# Patient Record
Sex: FEMALE | Race: BLACK OR AFRICAN AMERICAN | NOT HISPANIC OR LATINO | URBAN - METROPOLITAN AREA
[De-identification: names, ages, dates, MRNs, and addresses within clinical notes are randomized per-mention and may not be internally consistent; named-entity substitution may affect disease eponyms.]

---

## 2024-11-10 ENCOUNTER — APPOINTMENT (OUTPATIENT)
Dept: RADIOLOGY | Facility: HOSPITAL | Age: 39
End: 2024-11-10

## 2024-11-10 ENCOUNTER — HOSPITAL ENCOUNTER (OUTPATIENT)
Facility: HOSPITAL | Age: 39
Setting detail: OBSERVATION
Discharge: HOME | End: 2024-11-10
Attending: STUDENT IN AN ORGANIZED HEALTH CARE EDUCATION/TRAINING PROGRAM | Admitting: STUDENT IN AN ORGANIZED HEALTH CARE EDUCATION/TRAINING PROGRAM

## 2024-11-10 VITALS
HEIGHT: 68 IN | HEART RATE: 61 BPM | BODY MASS INDEX: 33.34 KG/M2 | TEMPERATURE: 96.4 F | OXYGEN SATURATION: 98 % | DIASTOLIC BLOOD PRESSURE: 59 MMHG | SYSTOLIC BLOOD PRESSURE: 123 MMHG | WEIGHT: 220 LBS | RESPIRATION RATE: 16 BRPM

## 2024-11-10 DIAGNOSIS — R11.2 NAUSEA AND VOMITING, UNSPECIFIED VOMITING TYPE: ICD-10-CM

## 2024-11-10 DIAGNOSIS — R10.84 GENERALIZED ABDOMINAL PAIN: Primary | ICD-10-CM

## 2024-11-10 PROBLEM — H66.90 ACUTE OTITIS MEDIA: Status: ACTIVE | Noted: 2024-11-10

## 2024-11-10 LAB
ALBUMIN SERPL BCP-MCNC: 4 G/DL (ref 3.4–5)
ALP SERPL-CCNC: 66 U/L (ref 33–110)
ALT SERPL W P-5'-P-CCNC: 8 U/L (ref 7–45)
ANION GAP SERPL CALC-SCNC: 14 MMOL/L (ref 10–20)
APTT PPP: 29 SECONDS (ref 27–38)
AST SERPL W P-5'-P-CCNC: 19 U/L (ref 9–39)
B-HCG SERPL-ACNC: <2 MIU/ML
BASOPHILS # BLD AUTO: 0.04 X10*3/UL (ref 0–0.1)
BASOPHILS NFR BLD AUTO: 0.7 %
BILIRUB SERPL-MCNC: 0.3 MG/DL (ref 0–1.2)
BUN SERPL-MCNC: 9 MG/DL (ref 6–23)
CALCIUM SERPL-MCNC: 9.4 MG/DL (ref 8.6–10.3)
CHLORIDE SERPL-SCNC: 102 MMOL/L (ref 98–107)
CO2 SERPL-SCNC: 22 MMOL/L (ref 21–32)
CREAT SERPL-MCNC: 0.75 MG/DL (ref 0.5–1.05)
EGFRCR SERPLBLD CKD-EPI 2021: >90 ML/MIN/1.73M*2
EOSINOPHIL # BLD AUTO: 0.08 X10*3/UL (ref 0–0.7)
EOSINOPHIL NFR BLD AUTO: 1.3 %
ERYTHROCYTE [DISTWIDTH] IN BLOOD BY AUTOMATED COUNT: 16.3 % (ref 11.5–14.5)
FERRITIN SERPL-MCNC: 9 NG/ML (ref 8–150)
GLUCOSE SERPL-MCNC: 200 MG/DL (ref 74–99)
HCT VFR BLD AUTO: 33.9 % (ref 36–46)
HGB BLD-MCNC: 10.2 G/DL (ref 12–16)
IMM GRANULOCYTES # BLD AUTO: 0.1 X10*3/UL (ref 0–0.7)
IMM GRANULOCYTES NFR BLD AUTO: 1.6 % (ref 0–0.9)
INR PPP: 1 (ref 0.9–1.1)
IRON SATN MFR SERPL: 3 % (ref 25–45)
IRON SERPL-MCNC: 14 UG/DL (ref 35–150)
LACTATE SERPL-SCNC: 1.6 MMOL/L (ref 0.4–2)
LACTATE SERPL-SCNC: 2.8 MMOL/L (ref 0.4–2)
LIPASE SERPL-CCNC: 49 U/L (ref 9–82)
LYMPHOCYTES # BLD AUTO: 1.71 X10*3/UL (ref 1.2–4.8)
LYMPHOCYTES NFR BLD AUTO: 27.8 %
MAGNESIUM SERPL-MCNC: 1.51 MG/DL (ref 1.6–2.4)
MCH RBC QN AUTO: 22.9 PG (ref 26–34)
MCHC RBC AUTO-ENTMCNC: 30.1 G/DL (ref 32–36)
MCV RBC AUTO: 76 FL (ref 80–100)
MONOCYTES # BLD AUTO: 0.44 X10*3/UL (ref 0.1–1)
MONOCYTES NFR BLD AUTO: 7.2 %
NEUTROPHILS # BLD AUTO: 3.78 X10*3/UL (ref 1.2–7.7)
NEUTROPHILS NFR BLD AUTO: 61.4 %
NRBC BLD-RTO: 0 /100 WBCS (ref 0–0)
PLATELET # BLD AUTO: 256 X10*3/UL (ref 150–450)
POTASSIUM SERPL-SCNC: 3.7 MMOL/L (ref 3.5–5.3)
PROT SERPL-MCNC: 7.3 G/DL (ref 6.4–8.2)
PROTHROMBIN TIME: 11.4 SECONDS (ref 9.8–12.8)
RBC # BLD AUTO: 4.46 X10*6/UL (ref 4–5.2)
SODIUM SERPL-SCNC: 134 MMOL/L (ref 136–145)
TIBC SERPL-MCNC: 402 UG/DL (ref 240–445)
UIBC SERPL-MCNC: 388 UG/DL (ref 110–370)
WBC # BLD AUTO: 6.2 X10*3/UL (ref 4.4–11.3)

## 2024-11-10 PROCEDURE — G0378 HOSPITAL OBSERVATION PER HR: HCPCS

## 2024-11-10 PROCEDURE — 99285 EMERGENCY DEPT VISIT HI MDM: CPT | Mod: 25

## 2024-11-10 PROCEDURE — 83550 IRON BINDING TEST: CPT | Performed by: STUDENT IN AN ORGANIZED HEALTH CARE EDUCATION/TRAINING PROGRAM

## 2024-11-10 PROCEDURE — 85610 PROTHROMBIN TIME: CPT | Performed by: NURSE PRACTITIONER

## 2024-11-10 PROCEDURE — 96361 HYDRATE IV INFUSION ADD-ON: CPT

## 2024-11-10 PROCEDURE — 74177 CT ABD & PELVIS W/CONTRAST: CPT

## 2024-11-10 PROCEDURE — 83690 ASSAY OF LIPASE: CPT | Performed by: NURSE PRACTITIONER

## 2024-11-10 PROCEDURE — 96366 THER/PROPH/DIAG IV INF ADDON: CPT

## 2024-11-10 PROCEDURE — 85730 THROMBOPLASTIN TIME PARTIAL: CPT | Performed by: NURSE PRACTITIONER

## 2024-11-10 PROCEDURE — 84702 CHORIONIC GONADOTROPIN TEST: CPT | Performed by: NURSE PRACTITIONER

## 2024-11-10 PROCEDURE — 83735 ASSAY OF MAGNESIUM: CPT | Performed by: NURSE PRACTITIONER

## 2024-11-10 PROCEDURE — 80053 COMPREHEN METABOLIC PANEL: CPT | Performed by: NURSE PRACTITIONER

## 2024-11-10 PROCEDURE — 83540 ASSAY OF IRON: CPT | Performed by: STUDENT IN AN ORGANIZED HEALTH CARE EDUCATION/TRAINING PROGRAM

## 2024-11-10 PROCEDURE — 99221 1ST HOSP IP/OBS SF/LOW 40: CPT | Performed by: NURSE PRACTITIONER

## 2024-11-10 PROCEDURE — 82728 ASSAY OF FERRITIN: CPT | Mod: PARLAB | Performed by: INTERNAL MEDICINE

## 2024-11-10 PROCEDURE — 74177 CT ABD & PELVIS W/CONTRAST: CPT | Mod: FOREIGN READ | Performed by: RADIOLOGY

## 2024-11-10 PROCEDURE — 2550000001 HC RX 255 CONTRASTS: Performed by: NURSE PRACTITIONER

## 2024-11-10 PROCEDURE — 36415 COLL VENOUS BLD VENIPUNCTURE: CPT | Performed by: NURSE PRACTITIONER

## 2024-11-10 PROCEDURE — 99235 HOSP IP/OBS SAME DATE MOD 70: CPT | Performed by: STUDENT IN AN ORGANIZED HEALTH CARE EDUCATION/TRAINING PROGRAM

## 2024-11-10 PROCEDURE — 96365 THER/PROPH/DIAG IV INF INIT: CPT

## 2024-11-10 PROCEDURE — 85025 COMPLETE CBC W/AUTO DIFF WBC: CPT | Performed by: NURSE PRACTITIONER

## 2024-11-10 PROCEDURE — 2500000004 HC RX 250 GENERAL PHARMACY W/ HCPCS (ALT 636 FOR OP/ED): Performed by: NURSE PRACTITIONER

## 2024-11-10 PROCEDURE — 83605 ASSAY OF LACTIC ACID: CPT | Performed by: NURSE PRACTITIONER

## 2024-11-10 PROCEDURE — 96375 TX/PRO/DX INJ NEW DRUG ADDON: CPT

## 2024-11-10 PROCEDURE — 96372 THER/PROPH/DIAG INJ SC/IM: CPT | Performed by: NURSE PRACTITIONER

## 2024-11-10 PROCEDURE — 99221 1ST HOSP IP/OBS SF/LOW 40: CPT | Performed by: SURGERY

## 2024-11-10 RX ORDER — ENOXAPARIN SODIUM 100 MG/ML
40 INJECTION SUBCUTANEOUS EVERY 24 HOURS
Status: DISCONTINUED | OUTPATIENT
Start: 2024-11-10 | End: 2024-11-10 | Stop reason: HOSPADM

## 2024-11-10 RX ORDER — INSULIN LISPRO 100 [IU]/ML
0-5 INJECTION, SOLUTION INTRAVENOUS; SUBCUTANEOUS
Status: DISCONTINUED | OUTPATIENT
Start: 2024-11-10 | End: 2024-11-10 | Stop reason: HOSPADM

## 2024-11-10 RX ORDER — MORPHINE SULFATE 2 MG/ML
1 INJECTION, SOLUTION INTRAMUSCULAR; INTRAVENOUS EVERY 4 HOURS PRN
Status: DISCONTINUED | OUTPATIENT
Start: 2024-11-10 | End: 2024-11-10 | Stop reason: HOSPADM

## 2024-11-10 RX ORDER — SODIUM CHLORIDE 9 MG/ML
100 INJECTION, SOLUTION INTRAVENOUS CONTINUOUS
Status: DISCONTINUED | OUTPATIENT
Start: 2024-11-10 | End: 2024-11-10 | Stop reason: HOSPADM

## 2024-11-10 RX ORDER — MORPHINE SULFATE 2 MG/ML
2 INJECTION, SOLUTION INTRAMUSCULAR; INTRAVENOUS EVERY 4 HOURS PRN
Status: DISCONTINUED | OUTPATIENT
Start: 2024-11-10 | End: 2024-11-10 | Stop reason: HOSPADM

## 2024-11-10 RX ORDER — MAGNESIUM SULFATE HEPTAHYDRATE 40 MG/ML
2 INJECTION, SOLUTION INTRAVENOUS ONCE
Status: COMPLETED | OUTPATIENT
Start: 2024-11-10 | End: 2024-11-10

## 2024-11-10 RX ORDER — MORPHINE SULFATE 4 MG/ML
4 INJECTION, SOLUTION INTRAMUSCULAR; INTRAVENOUS ONCE
Status: COMPLETED | OUTPATIENT
Start: 2024-11-10 | End: 2024-11-10

## 2024-11-10 RX ORDER — ONDANSETRON HYDROCHLORIDE 2 MG/ML
4 INJECTION, SOLUTION INTRAVENOUS ONCE
Status: COMPLETED | OUTPATIENT
Start: 2024-11-10 | End: 2024-11-10

## 2024-11-10 RX ORDER — ONDANSETRON HYDROCHLORIDE 2 MG/ML
4 INJECTION, SOLUTION INTRAVENOUS EVERY 6 HOURS PRN
Status: DISCONTINUED | OUTPATIENT
Start: 2024-11-10 | End: 2024-11-10 | Stop reason: HOSPADM

## 2024-11-10 RX ORDER — FAMOTIDINE 10 MG/ML
20 INJECTION INTRAVENOUS ONCE
Status: COMPLETED | OUTPATIENT
Start: 2024-11-10 | End: 2024-11-10

## 2024-11-10 RX ORDER — HALOPERIDOL 5 MG/ML
5 INJECTION INTRAMUSCULAR ONCE
Status: COMPLETED | OUTPATIENT
Start: 2024-11-10 | End: 2024-11-10

## 2024-11-10 RX ADMIN — FAMOTIDINE 20 MG: 10 INJECTION, SOLUTION INTRAVENOUS at 02:15

## 2024-11-10 RX ADMIN — MORPHINE SULFATE 4 MG: 4 INJECTION, SOLUTION INTRAMUSCULAR; INTRAVENOUS at 02:15

## 2024-11-10 RX ADMIN — ONDANSETRON 4 MG: 2 INJECTION INTRAMUSCULAR; INTRAVENOUS at 02:15

## 2024-11-10 RX ADMIN — IOHEXOL 75 ML: 350 INJECTION, SOLUTION INTRAVENOUS at 03:06

## 2024-11-10 RX ADMIN — HALOPERIDOL LACTATE 5 MG: 5 INJECTION, SOLUTION INTRAMUSCULAR at 01:43

## 2024-11-10 RX ADMIN — MAGNESIUM SULFATE HEPTAHYDRATE 2 G: 40 INJECTION, SOLUTION INTRAVENOUS at 03:56

## 2024-11-10 RX ADMIN — SODIUM CHLORIDE 1000 ML: 9 INJECTION, SOLUTION INTRAVENOUS at 02:15

## 2024-11-10 SDOH — ECONOMIC STABILITY: INCOME INSECURITY: IN THE PAST 12 MONTHS HAS THE ELECTRIC, GAS, OIL, OR WATER COMPANY THREATENED TO SHUT OFF SERVICES IN YOUR HOME?: NO

## 2024-11-10 SDOH — ECONOMIC STABILITY: FOOD INSECURITY: WITHIN THE PAST 12 MONTHS, YOU WORRIED THAT YOUR FOOD WOULD RUN OUT BEFORE YOU GOT THE MONEY TO BUY MORE.: NEVER TRUE

## 2024-11-10 SDOH — SOCIAL STABILITY: SOCIAL INSECURITY
WITHIN THE LAST YEAR, HAVE YOU BEEN KICKED, HIT, SLAPPED, OR OTHERWISE PHYSICALLY HURT BY YOUR PARTNER OR EX-PARTNER?: NO

## 2024-11-10 SDOH — SOCIAL STABILITY: SOCIAL INSECURITY: WITHIN THE LAST YEAR, HAVE YOU BEEN AFRAID OF YOUR PARTNER OR EX-PARTNER?: NO

## 2024-11-10 SDOH — ECONOMIC STABILITY: HOUSING INSECURITY: AT ANY TIME IN THE PAST 12 MONTHS, WERE YOU HOMELESS OR LIVING IN A SHELTER (INCLUDING NOW)?: NO

## 2024-11-10 SDOH — SOCIAL STABILITY: SOCIAL INSECURITY: DO YOU FEEL ANYONE HAS EXPLOITED OR TAKEN ADVANTAGE OF YOU FINANCIALLY OR OF YOUR PERSONAL PROPERTY?: NO

## 2024-11-10 SDOH — ECONOMIC STABILITY: FOOD INSECURITY: WITHIN THE PAST 12 MONTHS, THE FOOD YOU BOUGHT JUST DIDN'T LAST AND YOU DIDN'T HAVE MONEY TO GET MORE.: NEVER TRUE

## 2024-11-10 SDOH — SOCIAL STABILITY: SOCIAL INSECURITY: HAVE YOU HAD ANY THOUGHTS OF HARMING ANYONE ELSE?: NO

## 2024-11-10 SDOH — HEALTH STABILITY: PHYSICAL HEALTH
ON AVERAGE, HOW MANY DAYS PER WEEK DO YOU ENGAGE IN MODERATE TO STRENUOUS EXERCISE (LIKE A BRISK WALK)?: PATIENT DECLINED

## 2024-11-10 SDOH — ECONOMIC STABILITY: HOUSING INSECURITY: IN THE LAST 12 MONTHS, WAS THERE A TIME WHEN YOU WERE NOT ABLE TO PAY THE MORTGAGE OR RENT ON TIME?: NO

## 2024-11-10 SDOH — SOCIAL STABILITY: SOCIAL INSECURITY: HAS ANYONE EVER THREATENED TO HURT YOUR FAMILY OR YOUR PETS?: NO

## 2024-11-10 SDOH — SOCIAL STABILITY: SOCIAL INSECURITY: WITHIN THE LAST YEAR, HAVE YOU BEEN HUMILIATED OR EMOTIONALLY ABUSED IN OTHER WAYS BY YOUR PARTNER OR EX-PARTNER?: NO

## 2024-11-10 SDOH — SOCIAL STABILITY: SOCIAL INSECURITY
WITHIN THE LAST YEAR, HAVE YOU BEEN RAPED OR FORCED TO HAVE ANY KIND OF SEXUAL ACTIVITY BY YOUR PARTNER OR EX-PARTNER?: NO

## 2024-11-10 SDOH — SOCIAL STABILITY: SOCIAL INSECURITY: HAVE YOU HAD THOUGHTS OF HARMING ANYONE ELSE?: NO

## 2024-11-10 SDOH — ECONOMIC STABILITY: TRANSPORTATION INSECURITY: IN THE PAST 12 MONTHS, HAS LACK OF TRANSPORTATION KEPT YOU FROM MEDICAL APPOINTMENTS OR FROM GETTING MEDICATIONS?: NO

## 2024-11-10 SDOH — HEALTH STABILITY: PHYSICAL HEALTH: ON AVERAGE, HOW MANY MINUTES DO YOU ENGAGE IN EXERCISE AT THIS LEVEL?: PATIENT DECLINED

## 2024-11-10 SDOH — SOCIAL STABILITY: SOCIAL INSECURITY: ARE YOU OR HAVE YOU BEEN THREATENED OR ABUSED PHYSICALLY, EMOTIONALLY, OR SEXUALLY BY ANYONE?: NO

## 2024-11-10 SDOH — SOCIAL STABILITY: SOCIAL INSECURITY: ABUSE: ADULT

## 2024-11-10 SDOH — SOCIAL STABILITY: SOCIAL INSECURITY: DOES ANYONE TRY TO KEEP YOU FROM HAVING/CONTACTING OTHER FRIENDS OR DOING THINGS OUTSIDE YOUR HOME?: NO

## 2024-11-10 SDOH — SOCIAL STABILITY: SOCIAL INSECURITY: ARE THERE ANY APPARENT SIGNS OF INJURIES/BEHAVIORS THAT COULD BE RELATED TO ABUSE/NEGLECT?: NO

## 2024-11-10 SDOH — SOCIAL STABILITY: SOCIAL INSECURITY: WERE YOU ABLE TO COMPLETE ALL THE BEHAVIORAL HEALTH SCREENINGS?: YES

## 2024-11-10 SDOH — ECONOMIC STABILITY: HOUSING INSECURITY: IN THE PAST 12 MONTHS, HOW MANY TIMES HAVE YOU MOVED WHERE YOU WERE LIVING?: 0

## 2024-11-10 SDOH — ECONOMIC STABILITY: FOOD INSECURITY: HOW HARD IS IT FOR YOU TO PAY FOR THE VERY BASICS LIKE FOOD, HOUSING, MEDICAL CARE, AND HEATING?: NOT HARD AT ALL

## 2024-11-10 SDOH — SOCIAL STABILITY: SOCIAL INSECURITY: DO YOU FEEL UNSAFE GOING BACK TO THE PLACE WHERE YOU ARE LIVING?: NO

## 2024-11-10 ASSESSMENT — PAIN SCALES - GENERAL
PAINLEVEL_OUTOF10: 0 - NO PAIN
PAINLEVEL_OUTOF10: 10 - WORST POSSIBLE PAIN
PAINLEVEL_OUTOF10: 10 - WORST POSSIBLE PAIN
PAINLEVEL_OUTOF10: 0 - NO PAIN
PAINLEVEL_OUTOF10: 0 - NO PAIN

## 2024-11-10 ASSESSMENT — COGNITIVE AND FUNCTIONAL STATUS - GENERAL
MOBILITY SCORE: 24
PATIENT BASELINE BEDBOUND: NO
DAILY ACTIVITIY SCORE: 24

## 2024-11-10 ASSESSMENT — LIFESTYLE VARIABLES
AUDIT-C TOTAL SCORE: 4
HOW OFTEN DO YOU HAVE 6 OR MORE DRINKS ON ONE OCCASION: LESS THAN MONTHLY
HOW OFTEN DURING THE LAST YEAR HAVE YOU NEEDED AN ALCOHOLIC DRINK FIRST THING IN THE MORNING TO GET YOURSELF GOING AFTER A NIGHT OF HEAVY DRINKING: NEVER
HOW OFTEN DURING THE LAST YEAR HAVE YOU FOUND THAT YOU WERE NOT ABLE TO STOP DRINKING ONCE YOU HAD STARTED: NEVER
AUDIT TOTAL SCORE: 0
AUDIT-C TOTAL SCORE: 4
HOW OFTEN DURING THE LAST YEAR HAVE YOU HAD A FEELING OF GUILT OR REMORSE AFTER DRINKING: NEVER
SKIP TO QUESTIONS 9-10: 0
HAS A RELATIVE, FRIEND, DOCTOR, OR ANOTHER HEALTH PROFESSIONAL EXPRESSED CONCERN ABOUT YOUR DRINKING OR SUGGESTED YOU CUT DOWN: NO
HOW OFTEN DURING THE LAST YEAR HAVE YOU FAILED TO DO WHAT WAS NORMALLY EXPECTED FROM YOU BECAUSE OF DRINKING: NEVER
HOW OFTEN DURING THE LAST YEAR HAVE YOU BEEN UNABLE TO REMEMBER WHAT HAPPENED THE NIGHT BEFORE BECAUSE YOU HAD BEEN DRINKING: NEVER
AUDIT TOTAL SCORE: 4
HOW OFTEN DO YOU HAVE A DRINK CONTAINING ALCOHOL: 2-4 TIMES A MONTH
HOW MANY STANDARD DRINKS CONTAINING ALCOHOL DO YOU HAVE ON A TYPICAL DAY: 3 OR 4
HAVE YOU OR SOMEONE ELSE BEEN INJURED AS A RESULT OF YOUR DRINKING: NO

## 2024-11-10 ASSESSMENT — PATIENT HEALTH QUESTIONNAIRE - PHQ9
2. FEELING DOWN, DEPRESSED OR HOPELESS: NOT AT ALL
SUM OF ALL RESPONSES TO PHQ9 QUESTIONS 1 & 2: 0
1. LITTLE INTEREST OR PLEASURE IN DOING THINGS: NOT AT ALL

## 2024-11-10 ASSESSMENT — ACTIVITIES OF DAILY LIVING (ADL)
BATHING: INDEPENDENT
PATIENT'S MEMORY ADEQUATE TO SAFELY COMPLETE DAILY ACTIVITIES?: YES
HEARING - LEFT EAR: FUNCTIONAL
HEARING - RIGHT EAR: FUNCTIONAL
FEEDING YOURSELF: INDEPENDENT
WALKS IN HOME: INDEPENDENT
LACK_OF_TRANSPORTATION: NO
TOILETING: INDEPENDENT
GROOMING: INDEPENDENT
LACK_OF_TRANSPORTATION: NO
ADEQUATE_TO_COMPLETE_ADL: YES
DRESSING YOURSELF: INDEPENDENT
JUDGMENT_ADEQUATE_SAFELY_COMPLETE_DAILY_ACTIVITIES: YES

## 2024-11-10 NOTE — CONSULTS
Reason For Consult  Abnormal CT of the abdomen and pelvis with nausea vomiting.    History Of Present Illness  Mo Florez is a 39 y.o. female presenting with diffuse crampy abdominal pain associated with persistent nausea and vomiting since early last p.m.  She was seen and evaluated in the emergency room this a.m.  Was contacted by the emergency room regarding these findings and advised the attending physician to contact the advanced practitioner.  Since arrival the patient's symptoms have markedly improved.  She denies any abdominal pain at present.  She is tolerating clear liquids.  At the time of presentation there were no evidence of fever or chills.  She denies drug and alcohol abuse.     Past Medical History  She has no past medical history on file.  History of gestational diabetes    Surgical History  She has no past surgical history on file.  She has a past surgical history of gastric sleeve and .     Social History  She reports that she has never smoked. She has never been exposed to tobacco smoke. She has never used smokeless tobacco. She reports current alcohol use. She reports that she does not use drugs.    Family History  No family history on file.     Allergies  Ceftriaxone    Review of Systems 10 review of systems negative       Physical Exam  GENERAL  MENTAL STATUS - Alert. GENERAL APPEARANCE - Cooperative and well groomed. ORIENTATION - Oriented X4. BUILD & NUTRITION - Well nourished and well developed. HYDRATION - Well hydrated.    INTEGUMENTARY  GENERAL CHARACTERISTICS - Overall examination of the patient's skin reveals no rashes. COLOR - not icteric. SKIN MOISTURE - normal skin moisture. TEMPERATURE - normal worth is noted.    HEAD AND NECK  HEAD  HEAD SHAPE - Atraumatic and normocephalic.  TRACHEA - midline.  THYROID  GLAND CHARACTERISTICS - normal size and consistency and no palpable nodules.    EYE  SCLERA/CONJUNCTIVA - BILATERAL - Anicteric.    CHEST AND LUNG EXAM  AUSCULTATION  "-  BREATH SOUNDS - Clear.    BREAST - Did not examine.    CARDIOVASCULAR  AUSCULTATION: RHYTHM - Regular. HEART SOUNDS - Normal heart sounds.  MURMURS & OTHER HEART SOUNDS - Auscultation of the heart reveals regular rate and no murmurs.    ABDOMEN  PALPATION/PERCUSSION - Palpation and percussion of the abdomen reveal soft, non tender, no mass and no hepatosplenomegaly.  No abdominal wall hernia    LYMPHATIC  GENERAL LYMPHATICS  BREAST LYMPHATIC EXAM - No cervical adenopathy, supraclavicular adenopathy or axilliary adenopathy.        Last Recorded Vitals  Blood pressure 115/59, pulse 67, temperature 35.3 °C (95.5 °F), temperature source Temporal, resp. rate 16, height 1.72 m (5' 7.72\"), weight 99.8 kg (220 lb), SpO2 98%.    Relevant Results  I reviewed her laboratory studies.  Normal white count.  Only abnormal finding is glucose of 200.  I reviewed the CT imaging.  Findings more consistent with ileus than acute appendicitis     Assessment/Plan     Gastritis possible ileus.  I do not believe the clinical findings are consistent with acute appendicitis.  Patient tolerating diet.  Advised the patient that if she had persistent of symptoms after discharge she should seek immediate medical attention in the emergency room.  At this point again no surgical indication    I spent 30 minutes in the professional and overall care of this patient.      Jose Palafox MD    "

## 2024-11-10 NOTE — DISCHARGE SUMMARY
Discharge Diagnosis  Generalized abdominal pain    Issues Requiring Follow-Up    Test Results Pending At Discharge  Pending Labs       Order Current Status    Ferritin In process            Hospital Course  This is a 39-year-old female who initially presented to [facility] on 11/10/2024 with sudden onset diffuse abdominal pain and nausea with multiple episodes of vomiting the night prior.  Her last BM was last night.  She denies any fever or chills.  B-hCG negative.  CT abdomen/pelvis concerning for partial SBO with possible early appendicitis.  General surgery was consulted from the ED.   Patient seen by surgery and cleared for discharge.  Symptoms resolved with supportive care and tolerating PO intake.   Feels well for discharge.       The patient has been advised to maintain regular follow-up appointments with their primary care physician, and they have expressed their willingness to do so.     Discharge time spent 35 minutes.      Pertinent Physical Exam At Time of Discharge    GENERAL:   no distress, alert and cooperative  HEENT: Normal Inspection, Mucous membranes moist, No JVD, No Lymphadenopathy  CARDIOVASCULAR: RRR, no murmurs, 2+ equal pulses of the extremities, normal S1 and S 2  RESPIRATORY: Patent airways, CTAB, normal breath sounds with good chest expansion, thorax symmetric, No Wheezes, Rales or Rhonchi  ABDOMEN: Soft, Non-Tender, Normal Bowel Sounds, No Distention  SKIN: Warm and dry, no lesions, no rashes  EXTREMITIES: normal extremities, no cyanosis edema, contusions or wounds, no clubbing  NEURO: A&O x 3, CN II-XII grossly intact  PSYCH: Appropriate mood and behavior      Home Medications     Medication List      You have not been prescribed any medications.       Outpatient Follow-Up  No future appointments.    Yo Gentile, DO

## 2024-11-10 NOTE — CONSULTS
Reason For Consult  Possible bowel obstruction vs appendicitis    History Of Present Illness  Mo Florez is a 39 y.o. female with a PMH of gestational diabetes, DM (on Trulicity), presented to the ED with complaints of diffuse abdominal pain and vomiting. Patient is extremely drowsy and has to be aroused frequently to answer, but she states the symptoms began in the evening on Saturday. She reports her pain right now is in the upper mid abdomen/epigastric area only. She had multiple episodes of vomiting at home and upon arrival to the ED. She was given IM Haldol due to the lack of IV and cooperation initially when trying to initiate treatment, per the ED NP. She reports that she had diarrhea at home also. She is passing gas. Denies issues with constipation.  Patient denies any chest pain or SOB. Patient denies any fever, shaking, or chills. Patient denies any alcohol or drug use. She has had a gastric sleeve (2021) and 2 c-sections.    Labs in ED show no leukocytosis, Mag of 1.51 (given IV replacement in ED), lactate of 2.8, Na 134, and glucose 200. H/H was 10.2/33.9. CT A/P shows fluid-filled distended small bowel loops up to 3 cm without a definitive point of transition. There is fluid  throughout the entire length of the colon. Findings could represent a partial small bowel obstruction. Borderline size appendix at 6 mm with no surrounding inflammation and early appendicitis could not be excluded.        Surgical History  She has no past surgical history on file.     Social History  She has no history on file for tobacco use, alcohol use, and drug use.    Family History  No family history on file.     Allergies  Ceftriaxone    Review of Systems  A 10 point review of system is negative except for what is mentioned in the HPI       Physical Exam  Constitutional:       General: Not in acute distress.     Appearance: Very drowsy. Not ill-appearing.   HENT:      Head: Normocephalic.      Mouth: Mucous membranes are  moist.   Cardiovascular:      Rate and Rhythm: Normal rate and regular rhythm.      Pulses: Normal pulses.      Heart sounds: Normal heart sounds.   Pulmonary:      Effort: Pulmonary effort is normal. No respiratory distress.      Breath sounds: Normal breath sounds.   Abdominal:      General: Bowel sounds are normal. There is no distension.      Palpations: Abdomen is soft.      Tenderness: There is abdominal tenderness to the epigastric area   Skin:     General: Skin is warm and dry.   Neurological:      General: No focal deficit present.      Mental Status: Alert and oriented to person, place, and time.   Psychiatric:         Mood and Affect: Mood normal.         Behavior: Behavior is flat and drowsy.         Last Recorded Vitals  Blood pressure 137/72, pulse 78, temperature 36.7 °C (98.1 °F), temperature source Temporal, resp. rate 18, weight 99.8 kg (220 lb), SpO2 99%.    Relevant Results  Results for orders placed or performed during the hospital encounter of 11/10/24 (from the past 24 hours)   Lipase   Result Value Ref Range    Lipase 49 9 - 82 U/L   CBC and Auto Differential   Result Value Ref Range    WBC 6.2 4.4 - 11.3 x10*3/uL    nRBC 0.0 0.0 - 0.0 /100 WBCs    RBC 4.46 4.00 - 5.20 x10*6/uL    Hemoglobin 10.2 (L) 12.0 - 16.0 g/dL    Hematocrit 33.9 (L) 36.0 - 46.0 %    MCV 76 (L) 80 - 100 fL    MCH 22.9 (L) 26.0 - 34.0 pg    MCHC 30.1 (L) 32.0 - 36.0 g/dL    RDW 16.3 (H) 11.5 - 14.5 %    Platelets 256 150 - 450 x10*3/uL    Neutrophils % 61.4 40.0 - 80.0 %    Immature Granulocytes %, Automated 1.6 (H) 0.0 - 0.9 %    Lymphocytes % 27.8 13.0 - 44.0 %    Monocytes % 7.2 2.0 - 10.0 %    Eosinophils % 1.3 0.0 - 6.0 %    Basophils % 0.7 0.0 - 2.0 %    Neutrophils Absolute 3.78 1.20 - 7.70 x10*3/uL    Immature Granulocytes Absolute, Automated 0.10 0.00 - 0.70 x10*3/uL    Lymphocytes Absolute 1.71 1.20 - 4.80 x10*3/uL    Monocytes Absolute 0.44 0.10 - 1.00 x10*3/uL    Eosinophils Absolute 0.08 0.00 - 0.70 x10*3/uL     Basophils Absolute 0.04 0.00 - 0.10 x10*3/uL   Comprehensive Metabolic Panel   Result Value Ref Range    Glucose 200 (H) 74 - 99 mg/dL    Sodium 134 (L) 136 - 145 mmol/L    Potassium 3.7 3.5 - 5.3 mmol/L    Chloride 102 98 - 107 mmol/L    Bicarbonate 22 21 - 32 mmol/L    Anion Gap 14 10 - 20 mmol/L    Urea Nitrogen 9 6 - 23 mg/dL    Creatinine 0.75 0.50 - 1.05 mg/dL    eGFR >90 >60 mL/min/1.73m*2    Calcium 9.4 8.6 - 10.3 mg/dL    Albumin 4.0 3.4 - 5.0 g/dL    Alkaline Phosphatase 66 33 - 110 U/L    Total Protein 7.3 6.4 - 8.2 g/dL    AST 19 9 - 39 U/L    Bilirubin, Total 0.3 0.0 - 1.2 mg/dL    ALT 8 7 - 45 U/L   Magnesium   Result Value Ref Range    Magnesium 1.51 (L) 1.60 - 2.40 mg/dL   Coagulation Screen   Result Value Ref Range    Protime 11.4 9.8 - 12.8 seconds    INR 1.0 0.9 - 1.1    aPTT 29 27 - 38 seconds   hCG, quantitative, pregnancy   Result Value Ref Range    HCG, Beta-Quantitative <2 <5 mIU/mL   Lactate   Result Value Ref Range    Lactate 2.8 (H) 0.4 - 2.0 mmol/L    CT abdomen pelvis w IV contrast    Result Date: 11/10/2024  STUDY: CT Abdomen and Pelvis with IV Contrast; 11/10/2024 3:07 AM INDICATION: Diffuse abdominal pain, vomiting. COMPARISON: None. ACCESSION NUMBER(S): [id number] ORDERING CLINICIAN: CASE LYON TECHNIQUE: CT of the abdomen and pelvis was performed.  Contiguous axial images were obtained at 3 mm slice thickness through the abdomen and pelvis. Coronal and sagittal reconstructions at 3 mm slice thickness were performed.  75 mL Omnipaque-350 was administered intravenously. FINDINGS: LOWER CHEST: No cardiomegaly.  No pericardial effusion.  Lung bases are clear.  ABDOMEN:  LIVER: No hepatomegaly.  Smooth surface contour.  Normal attenuation.  BILE DUCTS: No intrahepatic or extrahepatic biliary ductal dilatation.  GALLBLADDER: The gallbladder is unremarkable. STOMACH: No abnormalities identified.  PANCREAS: No masses or ductal dilatation.  SPLEEN: No splenomegaly or focal splenic  lesion.  ADRENAL GLANDS: No thickening or nodules.  KIDNEYS AND URETERS: Kidneys are normal in size and location.  No renal or ureteral calculi.  PELVIS:  BLADDER: No abnormalities identified.  REPRODUCTIVE ORGANS: No abnormalities identified.  BOWEL: The small bowel is fluid-filled and distended up to 3 cm.  A definitive point of transition could not be determined.  There is small degree of fluid within the entire length of the colon.  Appendix is borderline in size at 6 mm no surrounding inflammation.  VESSELS: No abnormalities identified.  Abdominal aorta is normal in caliber.  PERITONEUM/RETROPERITONEUM/LYMPH NODES: No free fluid.  No pneumoperitoneum. No lymphadenopathy.  ABDOMINAL WALL: No abnormalities identified. SOFT TISSUES: No abnormalities identified.  BONES: No acute fracture or aggressive osseous lesion.    1.Fluid-filled distended small bowel loops up to 3 cm. A definitive point of transition could not be determined. There is fluid throughout the entire length of the colon. Findings could represent a partial small bowel obstruction. 2.Borderline size appendix at 6 mm no surrounding inflammation. Would be difficult to exclude early appendicitis based on this appearance. Signed by Jose Swartz, DO       Assessment/Plan   Mo Florez is a 39 y.o. female with a PMH of gestational diabetes, DM (on Trulicity), presented to the ED with complaints of diffuse abdominal pain and vomiting. Patient is extremely drowsy and has to be aroused frequently to answer, but she states the symptoms began in the evening on Saturday. She had multiple episodes of vomiting at home and upon arrival to the ED. She was given IM Haldol due to the lack of IV and cooperation initially when trying to initiate treatment, per the ED NP. She reports that she had diarrhea at home also. She is passing gas. She reports her pain right now is in the upper mid abdomen/epigastric area only. CT A/P showed possible SBO, possible early  appendicitis, nothing was conclusive.       Impression  #SBO vs early appendicitis    >no clear cause of pain and vomiting     >CT not clear on either    >no leukocytosis    >patient's pain location is different every time she is examined    >passing gas and having BMs    Recommendations   - Admit to medicine for observation   - No immediate surgical intervention indicated    >although it could be a possibility at some point depending on the patient's condition  - CLD for now to see how she tolerates it  - mIVF at a lower rate if taking liquids  - Analgesics/antiemetics  - No antibiotics indicated given normal WBCs and lack of inflammation around appendix  - Remainder of care per primary team    Patient seen and will be discussed with attending on call surgeon Dr. Palafox.       Mary Lou Suarez, SWATI-CNP

## 2024-11-10 NOTE — ED PROVIDER NOTES
HPI   No chief complaint on file.      Patient is an uncomfortable appearing 39-year-old female with past medical history of otitis media, gestational diabetes, obesity, presents to the emergency room today for complaint of diffuse abdominal pain and vomiting.  Patient states he was began earlier today prior to arrival.  Patient denies any dietary changes, injuries trauma or falls, urinary complaints.  Patient is complaining of some shortness of breath during vomiting episodes.  Patient denies any chest pain, contact with known ill individuals recent travel.  Patient states she started more time than she can count denies any blood in emesis.  Patient denies any fever, shaking, or chills.  Patient denies any alcohol or drug use.  Patient denies any marijuana use.              Patient History   No past medical history on file.  No past surgical history on file.  No family history on file.  Social History     Tobacco Use    Smoking status: Not on file    Smokeless tobacco: Not on file   Substance Use Topics    Alcohol use: Not on file    Drug use: Not on file       Physical Exam   ED Triage Vitals   Temp Pulse Resp BP   -- -- -- --      SpO2 Temp src Heart Rate Source Patient Position   -- -- -- --      BP Location FiO2 (%)     -- --       Physical Exam  Vitals and nursing note reviewed. Exam conducted with a chaperone present.   Constitutional:       General: She is not in acute distress.     Appearance: Normal appearance. She is not ill-appearing, toxic-appearing or diaphoretic.   HENT:      Head: Normocephalic.      Nose: Nose normal.      Mouth/Throat:      Mouth: Mucous membranes are moist.   Eyes:      Extraocular Movements: Extraocular movements intact.      Pupils: Pupils are equal, round, and reactive to light.   Cardiovascular:      Rate and Rhythm: Normal rate and regular rhythm.      Pulses: Normal pulses.      Heart sounds: Normal heart sounds. No murmur heard.     No friction rub. No gallop.   Pulmonary:       Effort: Pulmonary effort is normal. No respiratory distress.      Breath sounds: Normal breath sounds. No stridor. No wheezing, rhonchi or rales.   Chest:      Chest wall: No tenderness.   Abdominal:      General: Abdomen is flat. Bowel sounds are normal. There is no distension.      Palpations: Abdomen is soft. There is no mass.      Tenderness: There is abdominal tenderness. There is no right CVA tenderness, left CVA tenderness, guarding or rebound.      Hernia: No hernia is present.      Comments: Diffuse abdominal pain on exam   Musculoskeletal:         General: Normal range of motion.      Cervical back: Normal range of motion and neck supple.   Skin:     General: Skin is warm and dry.      Capillary Refill: Capillary refill takes less than 2 seconds.      Coloration: Skin is not jaundiced or pale.      Findings: No bruising, erythema, lesion or rash.   Neurological:      Mental Status: She is alert.           ED Course & [facility]   ED Course as of 11/10/24 0431   Sun Nov 10, 2024   0156 EKG interpreted by myself reveals normal sinus rhythm at a rate of 92 bpm with no ST elevation however there is T wave inversion in lead V1, there is no previous EKG for comparison.  UT interval 164, QRS 92 and QTc of 457. [EC]   0337 CT abdomen and pelvis reveals  IMPRESSION:  1.Fluid-filled distended small bowel loops up to 3 cm. A  definitive point of transition could not be determined. There is fluid  throughout the entire length of the colon. Findings could represent a  partial small bowel obstruction.  2.Borderline size appendix at 6 mm no surrounding inflammation.   Would be difficult to exclude early appendicitis based on this  appearance   [EC]      ED Course User Index  [EC] SWATI Castellano-CNP         Diagnoses as of 11/10/24 0431   Generalized abdominal pain   Nausea and vomiting, unspecified vomiting type                 No data recorded                                 Medical Decision Making  Given patient's  complaint presentation a thorough exam was performed initially upon arrival to emergency room patient having several loud verbal outbursts disrupting care.  Patient states pain began abruptly prior to arrival and denies any alcohol or drug use.  Patient does have diffuse abdominal pain on exam, no adventitious lung sounds auscultated, speaking clearly, cardiac sounds auscultated are regular.  Lab work is ordered including IV fluid, Zofran, Pepcid, morphine, EKG and CT abdomen and pelvis. Lab work reveals several irregularities of any critical lab values, magnesium level was low at 1.51.  Patient received magnesium replacement in emergency room.  Lactate level was 2.8.  CT of the abdomen and pelvis as interpreted by radiologist results are listed above with concern for small bowel obstruction and early onset appendicitis.  I consulted surgeon Dr. Palafox in regards to findings who recommended contacting [facility] in house for further evaluation.  I spoke with trauma [facility] who will be down to evaluate patient at this time.  Reevaluation in a patient does reveal improvement she states she does feel better but still experiencing some abdominal discomfort.  Trauma [facility] states she will talk to surgeon but recommends no immediate intervention at this time.  Keep patient on clear liquid diet and recommends admission to medical team for overnight observation of questionable appendicitis and bowel obstruction.  I consulted hospitalist Dr. Og in regards to admission.  Dr. Og has agreed admit patient to her service and will manage inpatient care.    CHRISTI Castellano     Portions of this note were generated using digital voice recognition software, and may contain grammatical errors      Procedure  Procedures     CHRISTI Castellaon  11/10/24 0431

## 2024-11-10 NOTE — H&P
Subjective   Mo Florez is a 39 y.o. female who presents for No chief complaint on file.    HPI  This is a 39-year-old female who [facility] with sudden onset diffuse abdominal pain and nausea with multiple episodes of vomiting the night prior.  Her last BM was last night.  She denies any fever or chills.  B-hCG negative.  CT abdomen/pelvis concerning for partial SBO with possible early appendicitis.  General surgery was consulted from the ED.  Of note, she was given IM Haldol while in the ED due to lack of cooperation while staff was attempting to obtain IV access.    PMH:  Obesity  Hx gestational diabetes    PSH:  x2, gastric sleeve  FH: DM  SH: Occasional alcohol use. Denies tobacco or illicit drug use.    Review of Systems:  12-point ROS was reviewed and is negative, unless otherwise noted in HPI    Objective   Vitals:    11/10/24 0445   BP:    Pulse: 99   Resp: 16   Temp:    SpO2: 99%       Physical Exam:   Constitutional: somnolent but easily arousable, no acute distress  ENMT: mucous membranes moist, conjunctivae clear  Head/Neck: normocephalic, atraumatic; supple, trachea midline  Respiratory/Thorax: patent airways, CTAB; no wheezes, rales, or rhonchi  Cardiovascular: RRR, no murmur appreciated  Gastrointestinal: soft, nondistended, non-tender, bowel sounds appreciated  Extremities: palpable peripheral pulses, no edema  Neurological: AO x3, no focal deficits  Psychological: appropriate mood and behavior  Skin: warm and dry    Scheduled Medications:   magnesium sulfate, 2 g, intravenous, Once       Continuous Medications:       PRN Medications:       Assessment/Plan     Partial SBO  Borderline appendix  Lactic acidosis, resolved  Hypomagnesemia  Microcytic anemia    Obesity  Hx gestational diabetes    General surgery consulted from the ED. Passing flatus with last BM on night of . Will have her on clear liquids per surgery. Start maintenance IVF given numerous  episodes of emesis. Pain control, PRN Zofran. Monitor and replete electrolytes PRN.  Defer antibiotics given lack of inflammation, leukocytosis, and fevers.  Re: microcytic anemia, will check iron studies. Consider iron supplementation pending results.    DVT PPX: Lovenox  Code status: FULL    Gilmer Og [facility]

## 2024-11-10 NOTE — CARE PLAN
The patient's goals for the shift include      The clinical goals for the shift include  safety and comfort

## 2024-11-10 NOTE — PROGRESS NOTES
11/10/24 1531   Discharge Planning   Living Arrangements Family members   Support Systems Family members   Assistance Needed None   Type of Residence Private residence   Home or Post Acute Services None   Expected Discharge Disposition Home   Does the patient need discharge transport arranged? No     TCC Note: Pt came from home with family members. Returning home today no needs. Pt has a written discharge. Family will transport pt home. Went to pt 's room to meet with her and obtain information for Discharge Assessment however, pt had already gone. Trista Charlton, MSN, RN, TCC.

## 2024-11-10 NOTE — HOSPITAL COURSE
This is a 39-year-old female who initially presented to [facility] on 11/10/2024 with sudden onset diffuse abdominal pain and nausea with multiple episodes of vomiting the night prior.  Her last BM was last night.  She denies any fever or chills.  B-hCG negative.  CT abdomen/pelvis concerning for partial SBO with possible early appendicitis.  General surgery was consulted from the ED.   Patient seen by surgery and cleared for discharge.  Symptoms resolved with supportive care and tolerating PO intake.   Feels well for discharge.